# Patient Record
Sex: FEMALE
[De-identification: names, ages, dates, MRNs, and addresses within clinical notes are randomized per-mention and may not be internally consistent; named-entity substitution may affect disease eponyms.]

---

## 2018-12-02 ENCOUNTER — HOSPITAL ENCOUNTER (EMERGENCY)
Dept: HOSPITAL 92 - ERS | Age: 48
Discharge: LEFT BEFORE DISCHARGE | End: 2018-12-02
Payer: SELF-PAY

## 2018-12-02 DIAGNOSIS — Z53.21: Primary | ICD-10-CM

## 2019-06-02 ENCOUNTER — HOSPITAL ENCOUNTER (EMERGENCY)
Dept: HOSPITAL 92 - ERS | Age: 49
Discharge: HOME | End: 2019-06-02
Payer: SELF-PAY

## 2019-06-02 DIAGNOSIS — R10.13: Primary | ICD-10-CM

## 2019-06-02 LAB
BACTERIA UR QL AUTO: (no result) HPF
BASOPHILS # BLD AUTO: 0.1 THOU/UL (ref 0–0.2)
BASOPHILS NFR BLD AUTO: 1 % (ref 0–1)
CK SERPL-CCNC: 85 U/L (ref 29–168)
CO2 SERPL-SCNC: 24 MMOL/L (ref 22–29)
CRYSTAL-AUWI FLAG: 0 (ref 0–15)
EOSINOPHIL # BLD AUTO: 0.2 THOU/UL (ref 0–0.7)
EOSINOPHIL NFR BLD AUTO: 3.6 % (ref 0–10)
HEV IGM SER QL: 26.5 (ref 0–7.99)
HGB BLD-MCNC: 13.9 G/DL (ref 12–16)
HYALINE CASTS #/AREA URNS LPF: (no result) LPF
LIPASE SERPL-CCNC: 20 U/L (ref 8–78)
LYMPHOCYTES # BLD: 1.9 THOU/UL (ref 1.2–3.4)
LYMPHOCYTES NFR BLD AUTO: 33 % (ref 21–51)
MCH RBC QN AUTO: 30.3 PG (ref 27–31)
MCV RBC AUTO: 90.2 FL (ref 78–98)
MONOCYTES # BLD AUTO: 0.4 THOU/UL (ref 0.11–0.59)
MONOCYTES NFR BLD AUTO: 7.5 % (ref 0–10)
NEUTROPHILS # BLD AUTO: 3.1 THOU/UL (ref 1.4–6.5)
NEUTROPHILS NFR BLD AUTO: 54.9 % (ref 42–75)
PATHC CAST-AUWI FLAG: 1.08 (ref 0–2.49)
PLATELET # BLD AUTO: 150 THOU/UL (ref 130–400)
PROT UR STRIP.AUTO-MCNC: 30 MG/DL
RBC # BLD AUTO: 4.59 MILL/UL (ref 4.2–5.4)
RBC UR QL AUTO: (no result) HPF (ref 0–3)
SP GR UR STRIP: 1.02 (ref 1–1.04)
SPERM-AUWI FLAG: 0 (ref 0–9.9)
WBC # BLD AUTO: 5.6 THOU/UL (ref 4.8–10.8)
YEAST-AUWI FLAG: 0 (ref 0–25)

## 2019-06-02 PROCEDURE — 85025 COMPLETE CBC W/AUTO DIFF WBC: CPT

## 2019-06-02 PROCEDURE — 36415 COLL VENOUS BLD VENIPUNCTURE: CPT

## 2019-06-02 PROCEDURE — 71046 X-RAY EXAM CHEST 2 VIEWS: CPT

## 2019-06-02 PROCEDURE — 84484 ASSAY OF TROPONIN QUANT: CPT

## 2019-06-02 PROCEDURE — 82550 ASSAY OF CK (CPK): CPT

## 2019-06-02 PROCEDURE — 93005 ELECTROCARDIOGRAM TRACING: CPT

## 2019-06-02 PROCEDURE — 81015 MICROSCOPIC EXAM OF URINE: CPT

## 2019-06-02 PROCEDURE — 96374 THER/PROPH/DIAG INJ IV PUSH: CPT

## 2019-06-02 PROCEDURE — 96375 TX/PRO/DX INJ NEW DRUG ADDON: CPT

## 2019-06-02 PROCEDURE — 83690 ASSAY OF LIPASE: CPT

## 2019-06-02 PROCEDURE — 82374 ASSAY BLOOD CARBON DIOXIDE: CPT

## 2019-06-02 PROCEDURE — C9113 INJ PANTOPRAZOLE SODIUM, VIA: HCPCS

## 2019-06-02 PROCEDURE — 81003 URINALYSIS AUTO W/O SCOPE: CPT

## 2019-06-02 NOTE — RAD
EXAM:

Chest PA and lateral:



HISTORY:

Epigastric pain. 



COMPARISON:

11/20/2016



FINDINGS:



Heart: Normal cardiac silhouette

Aorta: Unremarkable

Pulmonary vessels: Normal

Costophrenic angles: Costophrenic angles are clear. 

Lungs: No consolidation or masses.

Pneumothorax: No pneumothorax

Osseous structures: No osseous abnormalities

IMPRESSION:

No acute cardiopulmonary process. 







Reported By: Bennett Guaman 

Electronically Signed:  6/2/2019 12:39 PM

## 2020-02-16 ENCOUNTER — HOSPITAL ENCOUNTER (EMERGENCY)
Dept: HOSPITAL 92 - ERS | Age: 50
Discharge: HOME | End: 2020-02-16
Payer: SELF-PAY

## 2020-02-16 DIAGNOSIS — R10.13: Primary | ICD-10-CM

## 2020-02-16 LAB
ALBUMIN SERPL BCG-MCNC: 4.3 G/DL (ref 3.5–5)
ALP SERPL-CCNC: 142 U/L (ref 40–110)
ALT SERPL W P-5'-P-CCNC: 50 U/L (ref 8–55)
ANION GAP SERPL CALC-SCNC: 13 MMOL/L (ref 10–20)
AST SERPL-CCNC: 41 U/L (ref 5–34)
BASOPHILS # BLD AUTO: 0 THOU/UL (ref 0–0.2)
BASOPHILS NFR BLD AUTO: 0.1 % (ref 0–1)
BILIRUB SERPL-MCNC: 0.8 MG/DL (ref 0.2–1.2)
BUN SERPL-MCNC: 7 MG/DL (ref 7–18.7)
CALCIUM SERPL-MCNC: 9.1 MG/DL (ref 7.8–10.44)
CHLORIDE SERPL-SCNC: 105 MMOL/L (ref 98–107)
CO2 SERPL-SCNC: 25 MMOL/L (ref 22–29)
CREAT CL PREDICTED SERPL C-G-VRATE: 0 ML/MIN (ref 70–130)
EOSINOPHIL # BLD AUTO: 0.1 THOU/UL (ref 0–0.7)
EOSINOPHIL NFR BLD AUTO: 0.9 % (ref 0–10)
GLOBULIN SER CALC-MCNC: 3.6 G/DL (ref 2.4–3.5)
GLUCOSE SERPL-MCNC: 121 MG/DL (ref 70–105)
HGB BLD-MCNC: 14.2 G/DL (ref 12–16)
LIPASE SERPL-CCNC: 17 U/L (ref 8–78)
LYMPHOCYTES # BLD: 0.8 THOU/UL (ref 1.2–3.4)
LYMPHOCYTES NFR BLD AUTO: 10.9 % (ref 21–51)
MCH RBC QN AUTO: 31 PG (ref 27–31)
MCV RBC AUTO: 89.5 FL (ref 78–98)
MONOCYTES # BLD AUTO: 0.4 THOU/UL (ref 0.11–0.59)
MONOCYTES NFR BLD AUTO: 4.9 % (ref 0–10)
NEUTROPHILS # BLD AUTO: 6.3 THOU/UL (ref 1.4–6.5)
NEUTROPHILS NFR BLD AUTO: 83.2 % (ref 42–75)
PLATELET # BLD AUTO: 150 THOU/UL (ref 130–400)
POTASSIUM SERPL-SCNC: 3.6 MMOL/L (ref 3.5–5.1)
RBC # BLD AUTO: 4.58 MILL/UL (ref 4.2–5.4)
SODIUM SERPL-SCNC: 139 MMOL/L (ref 136–145)
WBC # BLD AUTO: 7.6 THOU/UL (ref 4.8–10.8)

## 2020-02-16 PROCEDURE — 85025 COMPLETE CBC W/AUTO DIFF WBC: CPT

## 2020-02-16 PROCEDURE — 83690 ASSAY OF LIPASE: CPT

## 2020-02-16 PROCEDURE — 36415 COLL VENOUS BLD VENIPUNCTURE: CPT

## 2020-02-16 PROCEDURE — 99284 EMERGENCY DEPT VISIT MOD MDM: CPT

## 2020-02-16 PROCEDURE — 80053 COMPREHEN METABOLIC PANEL: CPT

## 2021-01-04 ENCOUNTER — HOSPITAL ENCOUNTER (EMERGENCY)
Dept: HOSPITAL 92 - ERS | Age: 51
Discharge: HOME | End: 2021-01-04
Payer: SELF-PAY

## 2021-01-04 DIAGNOSIS — K21.9: Primary | ICD-10-CM

## 2021-01-04 LAB
ALBUMIN SERPL BCG-MCNC: 4.1 G/DL (ref 3.5–5)
ALP SERPL-CCNC: 107 U/L (ref 40–110)
ALT SERPL W P-5'-P-CCNC: 29 U/L (ref 8–55)
ANION GAP SERPL CALC-SCNC: 15 MMOL/L (ref 10–20)
AST SERPL-CCNC: 41 U/L (ref 5–34)
BASOPHILS # BLD AUTO: 0 THOU/UL (ref 0–0.2)
BASOPHILS NFR BLD AUTO: 0.3 % (ref 0–1)
BILIRUB SERPL-MCNC: 0.7 MG/DL (ref 0.2–1.2)
BUN SERPL-MCNC: 9 MG/DL (ref 9.8–20.1)
CALCIUM SERPL-MCNC: 8.7 MG/DL (ref 7.8–10.44)
CHLORIDE SERPL-SCNC: 105 MMOL/L (ref 98–107)
CO2 SERPL-SCNC: 24 MMOL/L (ref 22–29)
CREAT CL PREDICTED SERPL C-G-VRATE: 0 ML/MIN (ref 70–130)
EOSINOPHIL # BLD AUTO: 0.1 THOU/UL (ref 0–0.7)
EOSINOPHIL NFR BLD AUTO: 0.8 % (ref 0–10)
GLOBULIN SER CALC-MCNC: 3.6 G/DL (ref 2.4–3.5)
GLUCOSE SERPL-MCNC: 155 MG/DL (ref 70–105)
HGB BLD-MCNC: 13.4 G/DL (ref 12–16)
LIPASE SERPL-CCNC: 14 U/L (ref 8–78)
LYMPHOCYTES # BLD: 1.5 THOU/UL (ref 1.2–3.4)
LYMPHOCYTES NFR BLD AUTO: 21.2 % (ref 21–51)
MCH RBC QN AUTO: 30.7 PG (ref 27–31)
MCV RBC AUTO: 90.8 FL (ref 78–98)
MONOCYTES # BLD AUTO: 0.4 THOU/UL (ref 0.11–0.59)
MONOCYTES NFR BLD AUTO: 5.9 % (ref 0–10)
NEUTROPHILS # BLD AUTO: 5.2 THOU/UL (ref 1.4–6.5)
NEUTROPHILS NFR BLD AUTO: 71.8 % (ref 42–75)
PLATELET # BLD AUTO: 161 THOU/UL (ref 130–400)
POTASSIUM SERPL-SCNC: 3.5 MMOL/L (ref 3.5–5.1)
RBC # BLD AUTO: 4.35 MILL/UL (ref 4.2–5.4)
SODIUM SERPL-SCNC: 140 MMOL/L (ref 136–145)
WBC # BLD AUTO: 7.2 THOU/UL (ref 4.8–10.8)

## 2021-01-04 PROCEDURE — 36415 COLL VENOUS BLD VENIPUNCTURE: CPT

## 2021-01-04 PROCEDURE — 85025 COMPLETE CBC W/AUTO DIFF WBC: CPT

## 2021-01-04 PROCEDURE — 93005 ELECTROCARDIOGRAM TRACING: CPT

## 2021-01-04 PROCEDURE — 83690 ASSAY OF LIPASE: CPT

## 2021-01-04 PROCEDURE — 80053 COMPREHEN METABOLIC PANEL: CPT

## 2021-08-22 ENCOUNTER — HOSPITAL ENCOUNTER (EMERGENCY)
Dept: HOSPITAL 92 - ERS | Age: 51
Discharge: HOME | End: 2021-08-22
Payer: SELF-PAY

## 2021-08-22 DIAGNOSIS — Z20.822: ICD-10-CM

## 2021-08-22 DIAGNOSIS — R11.2: ICD-10-CM

## 2021-08-22 DIAGNOSIS — R53.1: ICD-10-CM

## 2021-08-22 DIAGNOSIS — R10.84: Primary | ICD-10-CM

## 2021-08-22 LAB
ALBUMIN SERPL BCG-MCNC: 4 G/DL (ref 3.5–5)
ALP SERPL-CCNC: 131 U/L (ref 40–110)
ALT SERPL W P-5'-P-CCNC: 134 U/L (ref 8–55)
ANION GAP SERPL CALC-SCNC: 14 MMOL/L (ref 10–20)
AST SERPL-CCNC: 148 U/L (ref 5–34)
BASOPHILS # BLD AUTO: 0 THOU/UL (ref 0–0.2)
BASOPHILS NFR BLD AUTO: 0.2 % (ref 0–1)
BILIRUB SERPL-MCNC: 0.8 MG/DL (ref 0.2–1.2)
BUN SERPL-MCNC: 8 MG/DL (ref 9.8–20.1)
CALCIUM SERPL-MCNC: 8.9 MG/DL (ref 7.8–10.44)
CHLORIDE SERPL-SCNC: 101 MMOL/L (ref 98–107)
CO2 SERPL-SCNC: 26 MMOL/L (ref 22–29)
CREAT CL PREDICTED SERPL C-G-VRATE: 0 ML/MIN (ref 70–130)
EOSINOPHIL # BLD AUTO: 0 THOU/UL (ref 0–0.7)
EOSINOPHIL NFR BLD AUTO: 0.1 % (ref 0–10)
GLOBULIN SER CALC-MCNC: 4 G/DL (ref 2.4–3.5)
GLUCOSE SERPL-MCNC: 106 MG/DL (ref 70–105)
HGB BLD-MCNC: 14.6 G/DL (ref 12–16)
LIPASE SERPL-CCNC: 47 U/L (ref 8–78)
LYMPHOCYTES # BLD: 0.8 THOU/UL (ref 1.2–3.4)
LYMPHOCYTES NFR BLD AUTO: 26.6 % (ref 21–51)
MCH RBC QN AUTO: 30.7 PG (ref 27–31)
MCV RBC AUTO: 89.6 FL (ref 78–98)
MONOCYTES # BLD AUTO: 0.2 THOU/UL (ref 0.11–0.59)
MONOCYTES NFR BLD AUTO: 6.6 % (ref 0–10)
NEUTROPHILS # BLD AUTO: 1.9 THOU/UL (ref 1.4–6.5)
NEUTROPHILS NFR BLD AUTO: 66.5 % (ref 42–75)
PLATELET # BLD AUTO: 104 THOU/UL (ref 130–400)
POTASSIUM SERPL-SCNC: 3.6 MMOL/L (ref 3.5–5.1)
PREGS CONTROL BACKGROUND?: (no result)
PREGS CONTROL BAR APPEAR?: YES
PROT UR STRIP.AUTO-MCNC: 10 MG/DL
RBC # BLD AUTO: 4.77 MILL/UL (ref 4.2–5.4)
SODIUM SERPL-SCNC: 137 MMOL/L (ref 136–145)
SP GR UR STRIP: 1.04 (ref 1–1.04)
WBC # BLD AUTO: 2.8 THOU/UL (ref 4.8–10.8)

## 2021-08-22 PROCEDURE — 93005 ELECTROCARDIOGRAM TRACING: CPT

## 2021-08-22 PROCEDURE — 84703 CHORIONIC GONADOTROPIN ASSAY: CPT

## 2021-08-22 PROCEDURE — 83690 ASSAY OF LIPASE: CPT

## 2021-08-22 PROCEDURE — 85025 COMPLETE CBC W/AUTO DIFF WBC: CPT

## 2021-08-22 PROCEDURE — 74177 CT ABD & PELVIS W/CONTRAST: CPT

## 2021-08-22 PROCEDURE — 81003 URINALYSIS AUTO W/O SCOPE: CPT

## 2021-08-22 PROCEDURE — 96374 THER/PROPH/DIAG INJ IV PUSH: CPT

## 2021-08-22 PROCEDURE — 80053 COMPREHEN METABOLIC PANEL: CPT

## 2021-10-23 ENCOUNTER — HOSPITAL ENCOUNTER (EMERGENCY)
Dept: HOSPITAL 92 - ERS | Age: 51
Discharge: HOME | End: 2021-10-23
Payer: COMMERCIAL

## 2021-10-23 DIAGNOSIS — K29.00: Primary | ICD-10-CM

## 2021-10-23 LAB
ALBUMIN SERPL BCG-MCNC: 4.1 G/DL (ref 3.5–5)
ALP SERPL-CCNC: 152 U/L (ref 40–110)
ALT SERPL W P-5'-P-CCNC: 41 U/L (ref 8–55)
ANION GAP SERPL CALC-SCNC: 14 MMOL/L (ref 10–20)
AST SERPL-CCNC: 55 U/L (ref 5–34)
BASOPHILS # BLD AUTO: 0 THOU/UL (ref 0–0.2)
BASOPHILS NFR BLD AUTO: 0.6 % (ref 0–1)
BILIRUB SERPL-MCNC: 0.5 MG/DL (ref 0.2–1.2)
BUN SERPL-MCNC: 10 MG/DL (ref 9.8–20.1)
CALCIUM SERPL-MCNC: 9.6 MG/DL (ref 7.8–10.44)
CHLORIDE SERPL-SCNC: 104 MMOL/L (ref 98–107)
CO2 SERPL-SCNC: 24 MMOL/L (ref 22–29)
CREAT CL PREDICTED SERPL C-G-VRATE: 0 ML/MIN (ref 70–130)
EOSINOPHIL # BLD AUTO: 0.2 THOU/UL (ref 0–0.7)
EOSINOPHIL NFR BLD AUTO: 3.7 % (ref 0–10)
GLOBULIN SER CALC-MCNC: 3.8 G/DL (ref 2.4–3.5)
GLUCOSE SERPL-MCNC: 118 MG/DL (ref 70–105)
HGB BLD-MCNC: 13.8 G/DL (ref 12–16)
LIPASE SERPL-CCNC: 24 U/L (ref 8–78)
LYMPHOCYTES # BLD: 1.9 THOU/UL (ref 1.2–3.4)
LYMPHOCYTES NFR BLD AUTO: 30.6 % (ref 21–51)
MCH RBC QN AUTO: 31.7 PG (ref 27–31)
MCV RBC AUTO: 90.9 FL (ref 78–98)
MONOCYTES # BLD AUTO: 0.5 THOU/UL (ref 0.11–0.59)
MONOCYTES NFR BLD AUTO: 7.3 % (ref 0–10)
NEUTROPHILS # BLD AUTO: 3.6 THOU/UL (ref 1.4–6.5)
NEUTROPHILS NFR BLD AUTO: 57.8 % (ref 42–75)
PLATELET # BLD AUTO: 168 THOU/UL (ref 130–400)
POTASSIUM SERPL-SCNC: 3.4 MMOL/L (ref 3.5–5.1)
PREGS CONTROL BACKGROUND?: (no result)
PREGS CONTROL BAR APPEAR?: YES
RBC # BLD AUTO: 4.36 MILL/UL (ref 4.2–5.4)
SODIUM SERPL-SCNC: 139 MMOL/L (ref 136–145)
SP GR UR STRIP: 1.03 (ref 1–1.04)
WBC # BLD AUTO: 6.3 THOU/UL (ref 4.8–10.8)

## 2021-10-23 PROCEDURE — 96374 THER/PROPH/DIAG INJ IV PUSH: CPT

## 2021-10-23 PROCEDURE — 80053 COMPREHEN METABOLIC PANEL: CPT

## 2021-10-23 PROCEDURE — C9113 INJ PANTOPRAZOLE SODIUM, VIA: HCPCS

## 2021-10-23 PROCEDURE — 93005 ELECTROCARDIOGRAM TRACING: CPT

## 2021-10-23 PROCEDURE — 74177 CT ABD & PELVIS W/CONTRAST: CPT

## 2021-10-23 PROCEDURE — 84484 ASSAY OF TROPONIN QUANT: CPT

## 2021-10-23 PROCEDURE — 96375 TX/PRO/DX INJ NEW DRUG ADDON: CPT

## 2021-10-23 PROCEDURE — 81003 URINALYSIS AUTO W/O SCOPE: CPT

## 2021-10-23 PROCEDURE — 84703 CHORIONIC GONADOTROPIN ASSAY: CPT

## 2021-10-23 PROCEDURE — 83690 ASSAY OF LIPASE: CPT

## 2021-10-23 PROCEDURE — 85025 COMPLETE CBC W/AUTO DIFF WBC: CPT
